# Patient Record
Sex: MALE | Race: WHITE | NOT HISPANIC OR LATINO | URBAN - METROPOLITAN AREA
[De-identification: names, ages, dates, MRNs, and addresses within clinical notes are randomized per-mention and may not be internally consistent; named-entity substitution may affect disease eponyms.]

---

## 2018-08-16 ENCOUNTER — EMERGENCY (EMERGENCY)
Facility: HOSPITAL | Age: 38
LOS: 1 days | Discharge: HOME | End: 2018-08-16
Admitting: EMERGENCY MEDICINE
Payer: SELF-PAY

## 2018-08-16 VITALS
HEART RATE: 73 BPM | RESPIRATION RATE: 18 BRPM | OXYGEN SATURATION: 96 % | TEMPERATURE: 98 F | HEIGHT: 71.5 IN | SYSTOLIC BLOOD PRESSURE: 148 MMHG | DIASTOLIC BLOOD PRESSURE: 85 MMHG | WEIGHT: 195.11 LBS
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
## 2018-08-16 DIAGNOSIS — S01.511A LACERATION WITHOUT FOREIGN BODY OF LIP, INITIAL ENCOUNTER: ICD-10-CM
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
## 2018-08-16 DIAGNOSIS — Z88.1 ALLERGY STATUS TO OTHER ANTIBIOTIC AGENTS STATUS: ICD-10-CM

## 2018-08-16 PROCEDURE — 99283 EMERGENCY DEPT VISIT LOW MDM: CPT | Mod: 25

## 2018-08-16 PROCEDURE — 99053 MED SERV 10PM-8AM 24 HR FAC: CPT

## 2018-08-16 PROCEDURE — 99282 EMERGENCY DEPT VISIT SF MDM: CPT

## 2018-08-16 NOTE — ED ADULT NURSE NOTE - OBJECTIVE STATEMENT
37 y/o male arrived to [facility] ER reporting laceration to upper lip since 10am yesterday. Upon assessment, patient is anxious, abdomen soft, lung fields WNL, breathing is equal and unlabored, pulses palpable, laceration noted to upper lip with no active bleeding. Pt was observed registering earlier at the earlier then leaving. Pt verbalized that he went to another hospital but he did not approve how they were snitching his lip. Pt denies chest pain, headache, dizziness, blurred vision, slurred speech, nausea, vomiting, diarrhea, fever, chills, LOC, SOB, weakness, fatigue, recent travel, and palpitations. Pt prefers to wait in waiting room due to the occupancy in the ER at this time. Will continue to monitor.

## 2018-08-16 NOTE — ED PROVIDER NOTE - MEDICAL DECISION MAKING DETAILS
markedly difficult interaction and patient unwilling to allow exam though waiting care and care in waiting area as does not want to come inside ED -> patient inflammatory after slightest touch examination at wound site to note depth and through and through nature even with introductory conversation that such was about to occur -> explained to patient that time frame was crucial with lacerations and repair as he had been told in other facilities was indicated -> offered patient care and even discussed plastics for closure but patient remained inflammatory and security involved -Nursing returned to patient to again offer services but patient elected to elope

## 2018-08-16 NOTE — ED PROVIDER NOTE - PHYSICAL EXAMINATION
large upper lip laceration through vermillion border into skin above upper left side lip -exam limited as patient hiding lip at all times and any attempt to exam patient met by obstruction and patient concerns. Unable to examine dentition or bony surrounding structures

## 2018-08-16 NOTE — ED ADULT NURSE NOTE - NSIMPLEMENTINTERV_GEN_ALL_ED
Implemented All Universal Safety Interventions:  [place] to call system. Call bell, personal items and telephone within reach. Instruct patient to call for assistance. Room bathroom lighting operational. Non-slip footwear when patient is off stretcher. Physically safe environment: no spills, clutter or unnecessary equipment. Stretcher in lowest position, wheels locked, appropriate side rails in place.

## 2018-08-16 NOTE — ED ADULT NURSE REASSESSMENT NOTE - NS ED NURSE REASSESS COMMENT FT1
Pt refused being evaluated by EDER Mendoza on multiple occasions despite teaching and encouragement to do so. Pt verbalized that he is leaving to go to another hospital.

## 2018-08-16 NOTE — ED ADULT NURSE REASSESSMENT NOTE - NS ED NURSE REASSESS COMMENT FT1
Pt refused to stay in treatment area due to his anxiety and preferred to be seen in waiting area. PA notified. ANM notified.
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
## 2018-08-20 ENCOUNTER — EMERGENCY (EMERGENCY)
Facility: HOSPITAL | Age: 38
LOS: 1 days | Discharge: HOME | End: 2018-08-20
Attending: STUDENT IN AN ORGANIZED HEALTH CARE EDUCATION/TRAINING PROGRAM
Payer: MEDICAID

## 2018-08-20 VITALS
TEMPERATURE: 96 F | DIASTOLIC BLOOD PRESSURE: 90 MMHG | HEART RATE: 83 BPM | SYSTOLIC BLOOD PRESSURE: 156 MMHG | RESPIRATION RATE: 18 BRPM

## 2018-08-20 PROCEDURE — 99283 EMERGENCY DEPT VISIT LOW MDM: CPT | Mod: 25

## 2018-08-20 PROCEDURE — 99283 EMERGENCY DEPT VISIT LOW MDM: CPT

## 2018-08-20 RX ADMIN — Medication 300 MILLIGRAM(S): at 03:07

## 2018-08-20 NOTE — ED PROVIDER NOTE - MEDICAL DECISION MAKING DETAILS
38 year old M Pt w/ lip laceration 4 days s/p injury, appears to have local infection, no systemic symptoms, given infection will not close laceration, will give antibiotics, follow up with plastic surgeon for delayed wound closure, cannot close wound as concern for active infection

## 2018-08-20 NOTE — ED PROVIDER NOTE - SKIN WOUND TYPE
left upper lip laceration, open, old sutures in place, mild discharge noted to wound, airway intact, normal voice, laceration through lip violating vermillion border/LACERATION(S)

## 2018-08-20 NOTE — ED PROVIDER NOTE - OBJECTIVE STATEMENT
38 year old M Pt w/ no PMHx presents to ED c/o laceration to left lip x 4 days ago. Sutures placed at outside hospital. Pt c/o swelling and pain to lip and sutures torn open. Pt denies nausea, vomiting, fever, chills, cough and all other complaints. NKDA

## 2018-11-30 ENCOUNTER — EMERGENCY (EMERGENCY)
Facility: HOSPITAL | Age: 38
LOS: 1 days | Discharge: HOME | End: 2018-11-30
Admitting: EMERGENCY MEDICINE
Payer: SELF-PAY

## 2018-11-30 VITALS
TEMPERATURE: 98 F | SYSTOLIC BLOOD PRESSURE: 147 MMHG | HEART RATE: 96 BPM | DIASTOLIC BLOOD PRESSURE: 99 MMHG | OXYGEN SATURATION: 99 % | RESPIRATION RATE: 15 BRPM

## 2018-11-30 VITALS
SYSTOLIC BLOOD PRESSURE: 133 MMHG | OXYGEN SATURATION: 98 % | DIASTOLIC BLOOD PRESSURE: 83 MMHG | HEART RATE: 59 BPM | TEMPERATURE: 98 F | RESPIRATION RATE: 20 BRPM

## 2018-11-30 PROCEDURE — 99053 MED SERV 10PM-8AM 24 HR FAC: CPT

## 2018-11-30 PROCEDURE — 99284 EMERGENCY DEPT VISIT MOD MDM: CPT | Mod: 25
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
## 2018-11-30 NOTE — ED ADULT NURSE NOTE - NSIMPLEMENTINTERV_GEN_ALL_ED
Implemented All Fall Risk Interventions:  [place] to call system. Call bell, personal items and telephone within reach. Instruct patient to call for assistance. Room bathroom lighting operational. Non-slip footwear when patient is off stretcher. Physically safe environment: no spills, clutter or unnecessary equipment. Stretcher in lowest position, wheels locked, appropriate side rails in place. Provide visual cue, wrist band, yellow gown, etc. Monitor gait and stability. Monitor for mental status changes and reorient to person, place, and time. Review medications for side effects contributing to fall risk. Reinforce activity limits and safety measures with patient and family.

## 2018-11-30 NOTE — ED PROVIDER NOTE - PHYSICAL EXAMINATION
General: lethargic, arousable to touch, smells of alcohol  Head: NCAT, no scalp tenderness  Eyes: PERRL  Heart: RRR  Lungs: CTAB  Abd: soft, NTND  Neuro: moves all 4 extremities equally  Skin: no e/o lacerations, abrasions, or ecchymoses

## 2018-11-30 NOTE — ED ADULT NURSE REASSESSMENT NOTE - NS ED NURSE REASSESS COMMENT FT1
Pt arousable at this time. Pt has slurred speech noted. Pt admits to taking medications but not able to verbalize what he took. Pt stable with no s.s of respiratory distress at this time.  Pt placed next to nursing station, will continue to monitor.
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
## 2018-11-30 NOTE — ED ADULT NURSE REASSESSMENT NOTE - NS ED NURSE REASSESS COMMENT FT1
Pt sleeping in chair at this time with no s.s of acute distress. Pt stable and will continue to monitor.

## 2018-11-30 NOTE — ED ADULT NURSE NOTE - OBJECTIVE STATEMENT
pt drowsy but arousable to voice with slurred speech. pt admits to taking drugs tonight. no sob or difficulty breathing noted. lung sounds clear bilaterally. skin appropriate for ethnicity, warm and dry. cap refill < 2 secs. pulses 2+ and regular. abd rounded soft and nontender. pt no obvious signs of injury noted. swelling to noted bilateral hands with excessive dryness.

## 2018-12-04 DIAGNOSIS — F19.129 OTHER PSYCHOACTIVE SUBSTANCE ABUSE WITH INTOXICATION, UNSPECIFIED: ICD-10-CM

## 2018-12-04 DIAGNOSIS — Z88.1 ALLERGY STATUS TO OTHER ANTIBIOTIC AGENTS STATUS: ICD-10-CM

## 2018-12-04 DIAGNOSIS — R41.82 ALTERED MENTAL STATUS, UNSPECIFIED: ICD-10-CM

## 2018-12-09 ENCOUNTER — EMERGENCY (EMERGENCY)
Facility: HOSPITAL | Age: 38
LOS: 1 days | Discharge: HOME | End: 2018-12-09
Admitting: EMERGENCY MEDICINE
Payer: SELF-PAY

## 2018-12-09 VITALS
TEMPERATURE: 97 F | SYSTOLIC BLOOD PRESSURE: 134 MMHG | DIASTOLIC BLOOD PRESSURE: 72 MMHG | OXYGEN SATURATION: 99 % | RESPIRATION RATE: 18 BRPM | HEART RATE: 86 BPM

## 2018-12-09 DIAGNOSIS — M25.562 PAIN IN LEFT KNEE: ICD-10-CM

## 2018-12-09 DIAGNOSIS — Z88.1 ALLERGY STATUS TO OTHER ANTIBIOTIC AGENTS STATUS: ICD-10-CM

## 2018-12-09 DIAGNOSIS — Z76.0 ENCOUNTER FOR ISSUE OF REPEAT PRESCRIPTION: ICD-10-CM

## 2018-12-09 DIAGNOSIS — G89.29 OTHER CHRONIC PAIN: ICD-10-CM

## 2018-12-09 DIAGNOSIS — M54.5 LOW BACK PAIN: ICD-10-CM

## 2018-12-09 PROCEDURE — 99282 EMERGENCY DEPT VISIT SF MDM: CPT | Mod: 25

## 2018-12-09 PROCEDURE — 99053 MED SERV 10PM-8AM 24 HR FAC: CPT

## 2018-12-09 RX ORDER — KETOROLAC TROMETHAMINE 30 MG/ML
60 SYRINGE (ML) INJECTION ONCE
Qty: 0 | Refills: 0 | Status: DISCONTINUED | OUTPATIENT
Start: 2018-12-09 | End: 2018-12-09

## 2018-12-09 NOTE — ED PROVIDER NOTE - MEDICAL DECISION MAKING DETAILS
Pt refused Toradol. Pt does not appear visibly anxious. AFVSS, sleeping comfortably in chair. Will D/C with instructions to F/U with Dr. Lay this week.

## 2018-12-09 NOTE — ED ADULT NURSE NOTE - OBJECTIVE STATEMENT
39 yo M c.o bl lower leg pain. Pt states "I have a history of dvt and my right leg is swollen and I have bulging veins up my thigh and my left knee hurts". Pt has noted swelling to right lower leg no redness or warmth noted and + dorsal pulses noted. Pt has not redness swelling or wamth or deformity noted to left knee. Pt denies fever, chills numbness or tingling, cp or sob at this time.

## 2018-12-09 NOTE — ED PROVIDER NOTE - OBJECTIVE STATEMENT
39 y/o M presents to the ED requesting Rx Xanax for his anxiety and Rx "pain meds" for his chronic left knee and lower back pain. He states motrin doesn't work and needs something stronger. Patient continues to fall back asleep when being spoken to. He denies any recent injury ot trauma. He has no other associated sx's or acute medical complaints at this time.

## 2018-12-09 NOTE — ED ADULT TRIAGE NOTE - CHIEF COMPLAINT QUOTE
pt requesting medication refill of xanax and also states that he is having an exacerbation of his chronic low back pain. pt falling asleep in triage,

## 2018-12-23 ENCOUNTER — EMERGENCY (EMERGENCY)
Facility: HOSPITAL | Age: 38
LOS: 1 days | Discharge: HOME | End: 2018-12-23
Attending: EMERGENCY MEDICINE | Admitting: EMERGENCY MEDICINE
Payer: MEDICAID

## 2018-12-23 VITALS
TEMPERATURE: 98 F | RESPIRATION RATE: 16 BRPM | SYSTOLIC BLOOD PRESSURE: 150 MMHG | HEART RATE: 98 BPM | OXYGEN SATURATION: 98 % | DIASTOLIC BLOOD PRESSURE: 90 MMHG

## 2018-12-23 VITALS
DIASTOLIC BLOOD PRESSURE: 123 MMHG | RESPIRATION RATE: 18 BRPM | OXYGEN SATURATION: 100 % | WEIGHT: 182.98 LBS | SYSTOLIC BLOOD PRESSURE: 208 MMHG | HEART RATE: 84 BPM | TEMPERATURE: 98 F

## 2018-12-23 DIAGNOSIS — Z88.1 ALLERGY STATUS TO OTHER ANTIBIOTIC AGENTS STATUS: ICD-10-CM

## 2018-12-23 DIAGNOSIS — Z88.8 ALLERGY STATUS TO OTHER DRUGS, MEDICAMENTS AND BIOLOGICAL SUBSTANCES: ICD-10-CM

## 2018-12-23 DIAGNOSIS — L03.115 CELLULITIS OF RIGHT LOWER LIMB: ICD-10-CM

## 2018-12-23 DIAGNOSIS — M79.661 PAIN IN RIGHT LOWER LEG: ICD-10-CM

## 2018-12-23 DIAGNOSIS — I82.401 ACUTE EMBOLISM AND THROMBOSIS OF UNSPECIFIED DEEP VEINS OF RIGHT LOWER EXTREMITY: ICD-10-CM

## 2018-12-23 DIAGNOSIS — F17.200 NICOTINE DEPENDENCE, UNSPECIFIED, UNCOMPLICATED: ICD-10-CM

## 2018-12-23 LAB
ALBUMIN SERPL ELPH-MCNC: 3 G/DL — LOW (ref 3.4–5)
ALP SERPL-CCNC: 160 U/L — HIGH (ref 40–120)
ALT FLD-CCNC: 37 U/L — SIGNIFICANT CHANGE UP (ref 12–42)
ANION GAP SERPL CALC-SCNC: 9 MMOL/L — SIGNIFICANT CHANGE UP (ref 9–16)
AST SERPL-CCNC: 32 U/L — SIGNIFICANT CHANGE UP (ref 15–37)
BASOPHILS NFR BLD AUTO: 0.3 % — SIGNIFICANT CHANGE UP (ref 0–2)
BILIRUB SERPL-MCNC: 0.4 MG/DL — SIGNIFICANT CHANGE UP (ref 0.2–1.2)
BUN SERPL-MCNC: 11 MG/DL — SIGNIFICANT CHANGE UP (ref 7–23)
CALCIUM SERPL-MCNC: 8.8 MG/DL — SIGNIFICANT CHANGE UP (ref 8.5–10.5)
CHLORIDE SERPL-SCNC: 103 MMOL/L — SIGNIFICANT CHANGE UP (ref 96–108)
CO2 SERPL-SCNC: 27 MMOL/L — SIGNIFICANT CHANGE UP (ref 22–31)
CREAT SERPL-MCNC: 0.66 MG/DL — SIGNIFICANT CHANGE UP (ref 0.5–1.3)
CRP SERPL-MCNC: 4.8 MG/DL — HIGH (ref 0–0.9)
EOSINOPHIL NFR BLD AUTO: 3.6 % — SIGNIFICANT CHANGE UP (ref 0–6)
ERYTHROCYTE [SEDIMENTATION RATE] IN BLOOD: 24 MM/HR — HIGH (ref 0–15)
GLUCOSE SERPL-MCNC: 111 MG/DL — HIGH (ref 70–99)
HCT VFR BLD CALC: 38.7 % — LOW (ref 39–50)
HGB BLD-MCNC: 12.6 G/DL — LOW (ref 13–17)
IMM GRANULOCYTES NFR BLD AUTO: 0.2 % — SIGNIFICANT CHANGE UP (ref 0–1.5)
LYMPHOCYTES # BLD AUTO: 24.2 % — SIGNIFICANT CHANGE UP (ref 13–44)
MCHC RBC-ENTMCNC: 28.2 PG — SIGNIFICANT CHANGE UP (ref 27–34)
MCHC RBC-ENTMCNC: 32.6 G/DL — SIGNIFICANT CHANGE UP (ref 32–36)
MCV RBC AUTO: 86.6 FL — SIGNIFICANT CHANGE UP (ref 80–100)
MONOCYTES NFR BLD AUTO: 10.2 % — SIGNIFICANT CHANGE UP (ref 2–14)
NEUTROPHILS NFR BLD AUTO: 61.5 % — SIGNIFICANT CHANGE UP (ref 43–77)
PLATELET # BLD AUTO: 278 K/UL — SIGNIFICANT CHANGE UP (ref 150–400)
POTASSIUM SERPL-MCNC: 3.9 MMOL/L — SIGNIFICANT CHANGE UP (ref 3.5–5.3)
POTASSIUM SERPL-SCNC: 3.9 MMOL/L — SIGNIFICANT CHANGE UP (ref 3.5–5.3)
PROT SERPL-MCNC: 7.1 G/DL — SIGNIFICANT CHANGE UP (ref 6.4–8.2)
RBC # BLD: 4.47 M/UL — SIGNIFICANT CHANGE UP (ref 4.2–5.8)
RBC # FLD: 13.2 % — SIGNIFICANT CHANGE UP (ref 10.3–14.5)
SODIUM SERPL-SCNC: 139 MMOL/L — SIGNIFICANT CHANGE UP (ref 132–145)
WBC # BLD: 5.8 K/UL — SIGNIFICANT CHANGE UP (ref 3.8–10.5)
WBC # FLD AUTO: 5.8 K/UL — SIGNIFICANT CHANGE UP (ref 3.8–10.5)

## 2018-12-23 PROCEDURE — 99284 EMERGENCY DEPT VISIT MOD MDM: CPT | Mod: 25

## 2018-12-23 PROCEDURE — 73610 X-RAY EXAM OF ANKLE: CPT | Mod: 26,RT

## 2018-12-23 PROCEDURE — 93971 EXTREMITY STUDY: CPT | Mod: 26,RT

## 2018-12-23 RX ORDER — SODIUM CHLORIDE 9 MG/ML
1000 INJECTION INTRAMUSCULAR; INTRAVENOUS; SUBCUTANEOUS ONCE
Refills: 0 | Status: COMPLETED | OUTPATIENT
Start: 2018-12-23 | End: 2018-12-23

## 2018-12-23 RX ORDER — ENOXAPARIN SODIUM 100 MG/ML
80 INJECTION SUBCUTANEOUS ONCE
Refills: 0 | Status: COMPLETED | OUTPATIENT
Start: 2018-12-23 | End: 2018-12-23

## 2018-12-23 RX ORDER — ENOXAPARIN SODIUM 100 MG/ML
80 INJECTION SUBCUTANEOUS
Qty: 2240 | Refills: 0
Start: 2018-12-23 | End: 2019-01-05

## 2018-12-23 RX ADMIN — SODIUM CHLORIDE 3000 MILLILITER(S): 9 INJECTION INTRAMUSCULAR; INTRAVENOUS; SUBCUTANEOUS at 17:58

## 2018-12-23 RX ADMIN — ENOXAPARIN SODIUM 80 MILLIGRAM(S): 100 INJECTION SUBCUTANEOUS at 20:18

## 2018-12-23 RX ADMIN — Medication 100 MILLIGRAM(S): at 17:58

## 2018-12-23 NOTE — ED PROVIDER NOTE - OBJECTIVE STATEMENT
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  Pt denies fever, chills, chest pain or sob.  Last use of heroin was this AM L hand.  Denies ever injecting to RLE.  Prior hx of R ankle fracture surgery (remote past).

## 2018-12-23 NOTE — ED PROVIDER NOTE - MEDICAL DECISION MAKING DETAILS
SS of cellulitis noted.  Pt does not appear septic.  Is in custody with policy.  VS noted - hypertensive, will recheck  ? related to stress of arrest or substance abuse (e.g. sympathomimetic).  No signs of withdrawal or overdose.  Pt is fully oriented and maintaining airway.  Given cellulitis proximity to prior remote R ankle surgery, plan xray ankle, obtain labs, u/s r/o DVT and give IV abx and fluids and reassess.

## 2018-12-23 NOTE — ED PROVIDER NOTE - MUSCULOSKELETAL, MLM
Spine appears normal, range of motion is not limited, RLE edema, mild, erythema present.  2/2 pulses DP/PT pulses bl LE.

## 2018-12-23 NOTE — ED ADULT NURSE NOTE - CHIEF COMPLAINT QUOTE
pt arrives in PD custody stating right lower leg swelling. Leg appears red, swollen, and hot to touch with a foul order.  hx of IV drug use, appears sleepy in triage.

## 2018-12-23 NOTE — ED PROVIDER NOTE - CARE PLAN
Principal Discharge DX:	Cellulitis of right lower extremity without foot Principal Discharge DX:	Cellulitis of right lower extremity without foot  Secondary Diagnosis:	Deep vein thrombosis (DVT) of right lower extremity, unspecified chronicity, unspecified vein

## 2018-12-23 NOTE — ED PROVIDER NOTE - DIAGNOSTIC INTERPRETATION
Xray R ankle:  Surgical hardware present and intact.  No soft tissue gas.  + swelling.  Nonspecific periosteal changes to distal tibia.

## 2018-12-23 NOTE — ED ADULT NURSE NOTE - NSIMPLEMENTINTERV_GEN_ALL_ED
Implemented All Fall Risk Interventions:  [place] to call system. Call bell, personal items and telephone within reach. Instruct patient to call for assistance. Room bathroom lighting operational. Non-slip footwear when patient is off stretcher. Physically safe environment: no spills, clutter or unnecessary equipment. Stretcher in lowest position, wheels locked, appropriate side rails in place. Provide visual cue, wrist band, yellow gown, etc. Monitor gait and stability. Monitor for mental status changes and reorient to person, place, and time. Review medications for side effects contributing to fall risk. Reinforce activity limits and safety measures with patient and family.

## 2018-12-23 NOTE — ED PROVIDER NOTE - SKIN, MLM
RLE with patchy warm slightly tender erythematous nonraised blanching macular rash from R upper ankle to mid RLE.   No crepitus, bullae.

## 2018-12-23 NOTE — ED ADULT NURSE REASSESSMENT NOTE - NS ED NURSE REASSESS COMMENT FT1
Assumed care for patient from VASQUEZ Krueger. Pt cleared for discharge. Pt refusing to sign/ listen to discharge instructions provided. Pt currently under arrest, released to custody of [facility] badge # [id number], all rx provided with discharge paperwork.

## 2018-12-23 NOTE — ED ADULT TRIAGE NOTE - CHIEF COMPLAINT QUOTE
pt arrives in PD custody stating right lower leg swelling. Leg appears red, swollen, and hot to touch with a foul order. pt arrives in PD custody stating right lower leg swelling. Leg appears red, swollen, and hot to touch with a foul order.  hx of IV drug use, appears sleepy in triage.

## 2019-02-05 NOTE — ED ADULT NURSE NOTE - NSFALLRSKPASTHIST_ED_ALL_ED
Patient seen and examined. Agree with above note by resident.    #Microcytic anemia - Plan for EGD/colon per GI tomorrow. hgb electrophoresis pending as well. No overt GIB. Pt reports green stools. Hemolysis workup unremarkable.    Plan discussed with pt Patient seen and examined. Agree with above note by resident.    #Microcytic anemia - S/p EGD/colon today. No signs of active source of bleeding. 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 Recommend outpt follow up with heme. C/e PO iron for now.     Plan discussed with pt . DC home today. Time spent 40 mins no

## 2019-05-02 ENCOUNTER — EMERGENCY (EMERGENCY)
Facility: HOSPITAL | Age: 39
LOS: 1 days | Discharge: HOME | End: 2019-05-02
Attending: EMERGENCY MEDICINE | Admitting: EMERGENCY MEDICINE
Payer: MEDICAID

## 2019-05-02 VITALS
SYSTOLIC BLOOD PRESSURE: 139 MMHG | RESPIRATION RATE: 18 BRPM | DIASTOLIC BLOOD PRESSURE: 95 MMHG | OXYGEN SATURATION: 97 % | HEART RATE: 96 BPM

## 2019-05-02 VITALS — TEMPERATURE: 98 F

## 2019-05-02 DIAGNOSIS — R41.82 ALTERED MENTAL STATUS, UNSPECIFIED: ICD-10-CM

## 2019-05-02 DIAGNOSIS — Z79.899 OTHER LONG TERM (CURRENT) DRUG THERAPY: ICD-10-CM

## 2019-05-02 DIAGNOSIS — Z88.1 ALLERGY STATUS TO OTHER ANTIBIOTIC AGENTS STATUS: ICD-10-CM

## 2019-05-02 DIAGNOSIS — Z79.2 LONG TERM (CURRENT) USE OF ANTIBIOTICS: ICD-10-CM

## 2019-05-02 DIAGNOSIS — F43.10 POST-TRAUMATIC STRESS DISORDER, UNSPECIFIED: ICD-10-CM

## 2019-05-02 DIAGNOSIS — Z88.8 ALLERGY STATUS TO OTHER DRUGS, MEDICAMENTS AND BIOLOGICAL SUBSTANCES: ICD-10-CM

## 2019-05-02 PROCEDURE — 99283 EMERGENCY DEPT VISIT LOW MDM: CPT

## 2019-05-02 RX ORDER — CLONAZEPAM 1 MG
2 TABLET ORAL ONCE
Refills: 0 | Status: DISCONTINUED | OUTPATIENT
Start: 2019-05-02 | End: 2019-05-02

## 2019-05-02 RX ADMIN — Medication 2 MILLIGRAM(S): at 21:32

## 2019-05-02 NOTE — ED PROVIDER NOTE - OBJECTIVE STATEMENT
39 male pt, hx of PTSD, chronically on benzos (klonopin), recently released from custodial. Had script left from custodial and given info for follow up in [place] but lost info. Has one more dose of benzo and requesting refill of klonopin. Also on suboxone as prescribed from custodial. no chest pain, no abd pain, no seizure episodes.

## 2019-05-02 NOTE — ED PROVIDER NOTE - CLINICAL SUMMARY MEDICAL DECISION MAKING FREE TEXT BOX
Here requesting script for benzos for chronic PTSD disorder. Will provide one dose in ED. no scripts provided. Encouraged proper f/u.

## 2019-05-02 NOTE — ED ADULT NURSE NOTE - NSIMPLEMENTINTERV_GEN_ALL_ED
Implemented All Fall Risk Interventions:  [place] to call system. Call bell, personal items and telephone within reach. Instruct patient to call for assistance. Room bathroom lighting operational. Non-slip footwear when patient is off stretcher. Physically safe environment: no spills, clutter or unnecessary equipment. Stretcher in lowest position, wheels locked, appropriate side rails in place. Provide visual cue, wrist band, yellow gown, etc. Monitor gait and stability. Monitor for mental status changes and reorient to person, place, and time. Review medications for side effects contributing to fall risk. Reinforce activity limits and safety measures with patient and family.

## 2019-05-02 NOTE — ED ADULT NURSE NOTE - OBJECTIVE STATEMENT
39 male pt, hx of PTSD, chronically on benzos (klonopin), recently released from longterm. Had script left from longterm and given info for follow up in [place] but lost info. Has one more dose of benzo and requesting refill of klonopin. Also on suboxone as prescribed from longterm. no chest pain, no abd pain, no seizure episodes.

## 2019-05-03 ENCOUNTER — EMERGENCY (EMERGENCY)
Facility: HOSPITAL | Age: 39
LOS: 1 days | Discharge: HOME | End: 2019-05-03
Attending: EMERGENCY MEDICINE | Admitting: EMERGENCY MEDICINE
Payer: MEDICAID

## 2019-05-03 VITALS
HEART RATE: 104 BPM | TEMPERATURE: 98 F | SYSTOLIC BLOOD PRESSURE: 165 MMHG | OXYGEN SATURATION: 97 % | RESPIRATION RATE: 18 BRPM | DIASTOLIC BLOOD PRESSURE: 91 MMHG

## 2019-05-03 DIAGNOSIS — M79.89 OTHER SPECIFIED SOFT TISSUE DISORDERS: ICD-10-CM

## 2019-05-03 DIAGNOSIS — I87.2 VENOUS INSUFFICIENCY (CHRONIC) (PERIPHERAL): ICD-10-CM

## 2019-05-03 DIAGNOSIS — Z88.1 ALLERGY STATUS TO OTHER ANTIBIOTIC AGENTS STATUS: ICD-10-CM

## 2019-05-03 PROBLEM — I82.409 ACUTE EMBOLISM AND THROMBOSIS OF UNSPECIFIED DEEP VEINS OF UNSPECIFIED LOWER EXTREMITY: Chronic | Status: ACTIVE | Noted: 2018-12-09

## 2019-05-03 PROCEDURE — 93971 EXTREMITY STUDY: CPT | Mod: 26,RT

## 2019-05-03 PROCEDURE — 99284 EMERGENCY DEPT VISIT MOD MDM: CPT

## 2019-05-03 NOTE — ED PROVIDER NOTE - OBJECTIVE STATEMENT
38 y/o male with long-standing hx of IV drug abuse (with past injections into the femoral veins, has been intermittently sober for years and currently on Buprenorphine program but uses drugs intermittently, has not used drugs in the groins in many years but had previous complication requiring Coumadin >10 years ago that he is vague on for details) presents to ED c/o right leg swelling and varicose veins for 9 months. Patient is concerned that constipation related to drug use and Buprenorphine use has been causing swelling in the leg. Reports that other MDs have suggested compression stockings, but patient believes them to be "incorrect". Denies any CP, SOB, or abd pain.

## 2019-05-03 NOTE — ED PROVIDER NOTE - CARE PROVIDER_API CALL
Mile C[facility]ton)  Vascular Surgery  [address] [place] [address], [address] [place] [address]  Phone: [phone]  Fax: [phone]  Follow Up Time:

## 2019-05-03 NOTE — ED PROVIDER NOTE - PSYCHIATRIC, MLM
Alert and oriented to person, place, time/situation. Quirky mood and affect, very peculiar rationalization around health care issues.

## 2019-05-03 NOTE — ED PROVIDER NOTE - NS_EDPROVIDERDISPOUSERTYPE_ED_A_ED
Scribe Attestation (For Scribes USE Only)... Attending Attestation (For Attendings USE Only).../Scribe Attestation (For Scribes USE Only)... less than 6 hrs per night

## 2019-05-03 NOTE — ED PROVIDER NOTE - CLINICAL SUMMARY MEDICAL DECISION MAKING FREE TEXT BOX
Patient presents with concern that constipation is causing leg swelling. Will check lower extremity doppler and abd x-ray, reassurance provided. Will provide vascular f/u if US is negative.

## 2019-05-03 NOTE — ED PROVIDER NOTE - MUSCULOSKELETAL, MLM
Right leg appears chronically swollen compared to the left, with extensive upper thigh and lower leg varicose veins.

## 2019-05-06 ENCOUNTER — EMERGENCY (EMERGENCY)
Facility: HOSPITAL | Age: 39
LOS: 1 days | Discharge: HOME | End: 2019-05-06
Attending: EMERGENCY MEDICINE | Admitting: EMERGENCY MEDICINE
Payer: MEDICAID

## 2019-05-06 VITALS
HEART RATE: 88 BPM | SYSTOLIC BLOOD PRESSURE: 136 MMHG | DIASTOLIC BLOOD PRESSURE: 90 MMHG | TEMPERATURE: 98 F | OXYGEN SATURATION: 99 % | RESPIRATION RATE: 16 BRPM

## 2019-05-06 DIAGNOSIS — Z88.0 ALLERGY STATUS TO PENICILLIN: ICD-10-CM

## 2019-05-06 DIAGNOSIS — F10.20 ALCOHOL DEPENDENCE, UNCOMPLICATED: ICD-10-CM

## 2019-05-06 DIAGNOSIS — R41.82 ALTERED MENTAL STATUS, UNSPECIFIED: ICD-10-CM

## 2019-05-06 PROBLEM — F19.10 OTHER PSYCHOACTIVE SUBSTANCE ABUSE, UNCOMPLICATED: Chronic | Status: ACTIVE | Noted: 2019-05-03

## 2019-05-06 PROCEDURE — 99283 EMERGENCY DEPT VISIT LOW MDM: CPT

## 2019-05-06 NOTE — ED PROVIDER NOTE - OBJECTIVE STATEMENT
39 male pt, hx of polysubstance abuse, bibems for ams. pt admits to drinking alcohol today. Otherwise not cooperative w rest of hx

## 2019-05-06 NOTE — ED PROVIDER NOTE - CARE PLAN
Principal Discharge DX:	Altered mental status  Secondary Diagnosis:	Polysubstance (excluding opioids) dependence

## 2019-05-06 NOTE — ED ADULT NURSE NOTE - NSIMPLEMENTINTERV_GEN_ALL_ED
Implemented All Universal Safety Interventions:  [place] to call system. Call bell, personal items and telephone within reach. Instruct patient to call for assistance. Room bathroom lighting operational. Non-slip footwear when patient is off stretcher. Physically safe environment: no spills, clutter or unnecessary equipment. Stretcher in lowest position, wheels locked, appropriate side rails in place.

## 2019-08-30 NOTE — ED ADULT NURSE NOTE - NSSISCREENINGQ2_ED_A_ED
No You can access the FollowMyHealth Patient Portal offered by [facility] by registering at the following website: [email or web address]. By joining [employer]’s FollowMyHealth portal, you will also be able to view your health information using other applications (apps) compatible with our system.

## 2024-01-18 NOTE — ED ADULT NURSE NOTE - BREATHING, MLM
Symptoms: COUGH, BODY ACHES,HEADACHE, FEVER, POSITIVE FOR COVID,         Onset: 1/16/2024 symptoms began  Location / description: Pt wife calling with reports of pt being COVID + tested positive today, with symptoms beginning   \"A couple days ago, he gets migraines, so we thought it wast that, he had some hot flashes but his temp was normal.\" This afternoon he started with a dry cough  HA, hot flashes, dry cough, nasal drainage that is clear.   Precipitating Factors: COVID +   Pain Scale (1-10), 10 highest: 6/10 body aches 4-6/10   What  improves / worsens symptoms:   Symptom specific medications: tylenol as needed and migraine medication which helped.   Recent   visits (last 3-4 weeks) for same reason or recent surgery:  no        PLAN:      Go to the Emergency Department  Paged Provider    Pt would like this RN to reach out to on call provider and see what PCP says.     93% \"runs in the low 90s, hx of smoking.\"         Meds that did not come up for cross reference   Abaloparatide   [place] honey OTC for wounds.     NOT   taking tramadol  MOBIC                       He can call us as well, per family.     _________________________________________________________________________  A [employer] page was sent to Daniel Borden      The page included the following message: Pt and wife calling due to pt being COVID +, symptoms began 1/16. Triaged per protocol and directed to ED due to chest pressure. Pt declined dispo and requested this RN reach out to PCP for further direction. O2 93. Please reach out to this RN for further direction. Ty!   Read  00:00:08      PER MD CALL BACK; MD Oumar SANTANA states that he did prescribe Molnupiravir, last visit pt was 96%, it is okay that they continue to monitor but if he dips lower than 92% to be seen in ED; keep an eye on the o2 and if he is feeling sob, he should go to ED in the mean time antiviral has been ordered.     If he wants to maybe follow up, send a message to  portal for review.       Molnupiravir 200 MG Cap   Sig - Route: Take 4 capsules by mouth every 12 hours for 5 days. - Oral   E-Prescribing Status: Receipt confirmed by pharmacy (1/18/2024  6:31 PM CST)   _______________________________________________________________________    Calling pt back to update pt on MD orders.     Went over things to look out for as far as SOB, worsening symptoms O2 dipping below 92 per MD verbal reccs. Informed pt and wife of antiviral being filled by PCP.   No further questions or concerns for this RN.     ROUTED: RADHA; pt COVID +, PCP ahsan ordered antiviral at this time for pt. Directed to send message to pool for pt f/u tomorrow. Ty!     Patient/Caller agrees to follow recommendations.    encouraged to call back with any additional questions, concerns, or change in symptoms, patient verbalized understanding.          Reason for Disposition   Chest pain or pressure  (Exception: MILD central chest pain, present only when coughing.)    Protocols used: Coronavirus (COVID-19) Diagnosed or [id number]     Spontaneous, unlabored and symmetrical

## 2024-09-16 NOTE — ED ADULT NURSE NOTE - NS ED PATIENT SAFETY CONCERN
PAST SURGICAL HISTORY:  H/O gastric bypass     No Past Surgical History     Stented coronary artery x 7 ( Last PCI in 2016)    
No